# Patient Record
Sex: FEMALE
[De-identification: names, ages, dates, MRNs, and addresses within clinical notes are randomized per-mention and may not be internally consistent; named-entity substitution may affect disease eponyms.]

---

## 2022-09-09 PROBLEM — Z00.00 ENCOUNTER FOR PREVENTIVE HEALTH EXAMINATION: Status: ACTIVE | Noted: 2022-09-09

## 2022-09-30 ENCOUNTER — APPOINTMENT (OUTPATIENT)
Dept: SURGERY | Facility: CLINIC | Age: 47
End: 2022-09-30
Payer: COMMERCIAL

## 2022-09-30 VITALS
RESPIRATION RATE: 17 BRPM | DIASTOLIC BLOOD PRESSURE: 104 MMHG | WEIGHT: 250 LBS | SYSTOLIC BLOOD PRESSURE: 147 MMHG | TEMPERATURE: 97.2 F | OXYGEN SATURATION: 96 % | HEIGHT: 69 IN | HEART RATE: 92 BPM | BODY MASS INDEX: 37.03 KG/M2

## 2022-09-30 DIAGNOSIS — K80.20 CALCULUS OF GALLBLADDER W/OUT CHOLECYSTITIS W/OUT OBSTRUCTION: ICD-10-CM

## 2022-09-30 DIAGNOSIS — Z78.9 OTHER SPECIFIED HEALTH STATUS: ICD-10-CM

## 2022-09-30 PROCEDURE — 99203 OFFICE O/P NEW LOW 30 MIN: CPT

## 2022-09-30 NOTE — HISTORY OF PRESENT ILLNESS
[de-identified] : Monica is a 48 y/o female here for a consultation visit. \par \par US Abdomen on 9/2/22 - Diffusely echogenic liver which can be seen in fatty infiltration or parenchymal liver disease. No focal massess. Gallstones without evidence for acute cholecystitis. Otherwise unremarkable examination. \par \par Labs on 9/3/22 - ALT of 35 (0-32) otherwise normal\par \par Today pt reports no pain. Did have abdominal pain randomly (sometimes radiating to back) - starting since Aug 7th 2022. Daily BMs, soft, no straining, no bleeding. Denies nausea and vomiting. Denies fever and chills. Good appetite. Not taking any anticoagulants.  No prior abdominal surgery

## 2022-09-30 NOTE — CONSULT LETTER
[Dear  ___] : Dear  [unfilled], [Consult Letter:] : I had the pleasure of evaluating your patient, [unfilled]. [Please see my note below.] : Please see my note below. [Consult Closing:] : Thank you very much for allowing me to participate in the care of this patient.  If you have any questions, please do not hesitate to contact me. [Sincerely,] : Sincerely, [FreeTextEntry3] : Atilio Coulter M.D., F.A.C.S, F.A.S.C.R.S [DrJaleel  ___] : Dr. TORRE

## 2022-09-30 NOTE — ASSESSMENT
[FreeTextEntry1] : In summary the patient has symptomatic cholelithiasis and probably has chronic cholecystitis.  I recommended she undergo a laparoscopic cholecystectomy.  I explained the risks benefits and alternatives of surgery including the risk of bleeding infection the possible need for an open procedure and the rare incidence of bile duct injury

## 2022-09-30 NOTE — PHYSICAL EXAM
[Normal Breath Sounds] : Normal breath sounds [Normal Rate and Rhythm] : normal rate and rhythm [No HSM] : no hepatosplenomegaly [No Rash or Lesion] : No rash or lesion [Alert] : alert [Calm] : calm [de-identified] : nad [de-identified] : Obese, soft, nontender, no palpable masses or hernias [de-identified] : nl

## 2022-11-16 ENCOUNTER — APPOINTMENT (OUTPATIENT)
Dept: SURGERY | Facility: HOSPITAL | Age: 47
End: 2022-11-16